# Patient Record
Sex: MALE
[De-identification: names, ages, dates, MRNs, and addresses within clinical notes are randomized per-mention and may not be internally consistent; named-entity substitution may affect disease eponyms.]

---

## 2021-03-29 ENCOUNTER — NURSE TRIAGE (OUTPATIENT)
Dept: OTHER | Facility: CLINIC | Age: 31
End: 2021-03-29

## 2021-03-29 NOTE — TELEPHONE ENCOUNTER
Brief description of triage: awhile ago pain in his testicles. Went to a Urologist- testing done with prostate exam. He has a tight pelvic floor and has been going to PT for this. Today in the shower he felt a tiny lump on the right testicle. When he gives himself exams on his testicles he does know that stuff shifts and changes. Triage indicates for patient to be seen within 3 days. Care advice provided, patient verbalizes understanding; denies any other questions or concerns; instructed to call back for any new or worsening symptoms. This triage is a result of a call to [address] [place] [address]. Please do not respond to the triage nurse through this encounter. Any subsequent communication should be directly with the patient. Reason for Disposition   [1] Swelling goes away when you push on it AND [2] no pain    Answer Assessment - Initial Assessment Questions  1. SCROTAL SWELLING: \"What does the scrotum look like? \" \"How swollen is it? \" (mild, moderate severe; compare to other side)     Looks swollen behind the testicle  But not noticeable    2. LOCATION: \"Where is the swelling located? \"      Right testicle    3. ONSET: \"When did the swelling start? \"      Today    4. PATTERN: \"Does it come and go, or has it been constant since it started? \"      Does come and go    5. SCROTAL PAIN: \"Is there any pain? \" If so, ask: \"How bad is it? \"  (Scale 1-10; or mild, moderate, severe)     Has had pain for one year and is being treated by PT for this    6. HERNIA: \"Has a doctor ever told you that you have a hernia? \"      Denies abdominal pain  Has a pain in his right hip- he feels grinding or an aching feeling that has been going on for a year  Denies hernia diagnosis- he reports at work yesterday when he had a BM- he felt like his left testicle went up into a different area of the scrotum and he did push this back down    7. OTHER SYMPTOMS: \"Do you have any other symptoms? \" (e.g., fever, abdominal pain, vomiting, difficulty passing urine)      Denies fever/abdominal pain/vomiting/difficulty with urination    Protocols used: SCROTUM [facility]    See above documentation